# Patient Record
Sex: MALE | Race: ASIAN | NOT HISPANIC OR LATINO | URBAN - METROPOLITAN AREA
[De-identification: names, ages, dates, MRNs, and addresses within clinical notes are randomized per-mention and may not be internally consistent; named-entity substitution may affect disease eponyms.]

---

## 2022-04-13 ENCOUNTER — EMERGENCY (EMERGENCY)
Facility: HOSPITAL | Age: 57
LOS: 1 days | Discharge: HOME | End: 2022-04-13
Attending: EMERGENCY MEDICINE | Admitting: EMERGENCY MEDICINE
Payer: MEDICAID

## 2022-04-13 VITALS
OXYGEN SATURATION: 98 % | DIASTOLIC BLOOD PRESSURE: 83 MMHG | HEART RATE: 78 BPM | TEMPERATURE: 99 F | RESPIRATION RATE: 16 BRPM | SYSTOLIC BLOOD PRESSURE: 130 MMHG

## 2022-04-13 DIAGNOSIS — B35.0 TINEA BARBAE AND TINEA CAPITIS: ICD-10-CM

## 2022-04-13 PROCEDURE — 99284 EMERGENCY DEPT VISIT MOD MDM: CPT

## 2022-04-13 PROCEDURE — 71046 X-RAY EXAM CHEST 2 VIEWS: CPT | Mod: 26

## 2022-04-13 RX ADMIN — Medication 1 APPLICATION(S): at 13:53

## 2022-04-13 NOTE — ED PROVIDER NOTE - PATIENT PORTAL LINK FT
You can access the FollowMyHealth Patient Portal offered by [facility] by registering at the following website: [email or web address]. By joining [facility]’s FollowMyHealth portal, you will also be able to view your health information using other applications (apps) compatible with our system.

## 2022-04-13 NOTE — ED PROVIDER NOTE - NS ED ATTENDING STATEMENT MOD
This was a shared visit with the CARRI. I reviewed and verified the documentation and independently performed the documented:

## 2022-04-13 NOTE — ED PROVIDER NOTE - OBJECTIVE STATEMENT
this is a 56 y.o male with no PMhx non-smoker presented to the ED complaining of having a rash for the past few weeks, he states that the rash is around his beard area, describes it as itchy, he attempted to change his razor and shaving cream however had no improvement, patient states that the rash spread from the left side to the right side of his face, He also admits to having chronic sinus congestion which is causing him to have difficulty airpassage through the nose, he attempted to go to the ENT whom did a scope and di dnot have any improvement, Patient, denies having any fever, chills, bodyaches, recent travel, leg swelling. He states that he does not have any difficulty with air passage through to his lungs.

## 2022-04-13 NOTE — ED PROVIDER NOTE - CLINICAL SUMMARY MEDICAL DECISION MAKING FREE TEXT BOX
Ann: Feels nasal/sinus congestion for a long time, causing SOB. ENT scope unremarkable. Worse in evening. Check CXR. No clinical e/o anemia. Also, 1 month bumpy rash on neck where he shaves and where he shaves the top of the mustache; no pain. Not improve despite changing razor. PE c/w tinea barbae. Treat w/ topical terbinafine.

## 2022-04-13 NOTE — ED PROVIDER NOTE - NSFOLLOWUPINSTRUCTIONS_ED_ALL_ED_FT
Rest, drink plenty of fluids.  Advance activity as tolerated.  Continue all previously prescribed medications as directed.  Follow up with your primary care physician in 48-72 hours- bring copies of your results.  Return to the ER for worsening or persistent symptoms, and/or ANY NEW OR CONCERNING SYMPTOMS. If you have issues obtaining follow up, please call: [phone] to obtain a doctor or specialist who takes your insurance in your area.  You may call [phone] to make an appointment with the internal medicine clinic.    please follow up with the dermatologist. please return if symptoms persist or worsen.

## 2022-04-13 NOTE — ED PROVIDER NOTE - CARE PLAN
Principal Discharge DX:	SOB (shortness of breath)  Secondary Diagnosis:	Sinus congestion   1 Principal Discharge DX:	Tinea barbae  Secondary Diagnosis:	Sinus congestion

## 2022-04-13 NOTE — ED PROVIDER NOTE - ATTENDING CONTRIBUTION TO CARE
I performed a face-to-face evaluation of the patient and performed a history and physical examination. I agree with the history and physical examination. If this was a PA visit, I personally saw the patient with the PA and performed a substantive portion of the visit including all aspects of the medical decision making.    Feels nasal/sinus congestion for a long time, causing SOB. ENT scope unremarkable. Worse in evening. Check CXR. Also, 1 month rash and bumps on neck; no pain. Not improve despite changing razor. No clinical e/o anemia. I performed a face-to-face evaluation of the patient and performed a history and physical examination. I agree with the history and physical examination. If this was a PA visit, I personally saw the patient with the PA and performed a substantive portion of the visit including all aspects of the medical decision making.    Feels nasal/sinus congestion for a long time, causing SOB. ENT scope unremarkable. Worse in evening. Check CXR. No clinical e/o anemia. Also, 1 month bumpy rash on neck where he shaves and where he shaves the top of the mustache; no pain. Not improve despite changing razor. PE c/w tinea barbae. Treat w/ topical terbinafine.

## 2023-05-25 NOTE — ED PROVIDER NOTE - SKIN COLOR
The Delivery OB Provider certifies that vaginal examination and/or abdominal examination after the delivery was done and no foreign body was found. rash noted to the area around the beard, and on the neck area, granulated rash noted, no active bleeding or discharge no surrounding erythema warmth or discharge from the area. rash consistent with fungal infection.